# Patient Record
Sex: FEMALE | Race: WHITE | NOT HISPANIC OR LATINO | Employment: OTHER | URBAN - NONMETROPOLITAN AREA
[De-identification: names, ages, dates, MRNs, and addresses within clinical notes are randomized per-mention and may not be internally consistent; named-entity substitution may affect disease eponyms.]

---

## 2023-07-24 ENCOUNTER — HOSPITAL ENCOUNTER (OUTPATIENT)
Dept: PHYSICAL THERAPY | Facility: HOSPITAL | Age: 78
Setting detail: THERAPIES SERIES
Discharge: HOME | End: 2023-07-24
Payer: MEDICARE

## 2023-07-24 DIAGNOSIS — M79.671 BILATERAL FOOT PAIN: Primary | ICD-10-CM

## 2023-07-24 DIAGNOSIS — M79.672 BILATERAL FOOT PAIN: Primary | ICD-10-CM

## 2023-07-24 DIAGNOSIS — M19.079 ARTHRITIS OF MIDFOOT: ICD-10-CM

## 2023-07-24 PROCEDURE — 97535 SELF CARE MNGMENT TRAINING: CPT

## 2023-07-24 PROCEDURE — 97162 PT EVAL MOD COMPLEX 30 MIN: CPT

## 2023-07-24 PROCEDURE — 97110 THERAPEUTIC EXERCISES: CPT

## 2023-07-24 NOTE — THERAPY EVALUATION
Outpatient Physical Therapy Ortho Initial Evaluation  [facility]     Patient Name: Mona Lyle  : 1945  MRN: [id number]  Today's Date: 2023      Patient seen for 1 PT sessions.  Patient reports N/A% of improvement.  Next MD appt: 2023   Recertification: 2023      Therapy Diagnosis: B foot pain with increased hallux valgus     Visit Date: 2023    There is no problem list on file for this patient.       Past Medical History:   Diagnosis Date    Arthritis     Osteoporosis     Sleep apnea         Past Surgical History:   Procedure Laterality Date    EYE SURGERY      SHOULDER SURGERY Right     rotator cuff       Visit Dx:     ICD-10-CM ICD-9-CM   1. Bilateral foot pain  M79.671 729.5    M79.672    2. Arthritis of midfoot  M19.079 716.97          Patient History       Row Name 23 0900             History    Chief Complaint Pain  -AC      Type of Pain Foot pain  -AC      Date Current Problem(s) Began --  the last 6 months  -AC      Brief Description of Current Complaint Patient reports her foot pain in the past 6 months. Had x-ray. Saw some arthritis. Patient rpeorts she wears sandals most of the time. Wears clarks in the winter. Reports tennis shoes at home but they dont feel like they fit. Reports she has a narrow foot. Denies feet surgery. Knees have been bothering from gardening. Bottom of feet bother her the most. Denies orthotics.  -AC      Patient/Caregiver Goals Relieve pain;Know what to do to help the symptoms  -AC      Current Tobacco Use none  -AC      Smoking Status never  -AC      Patient's Rating of General Health Good  -AC      Occupation/sports/leisure activities Hobbies: gardening  -AC      Patient seeing anyone else for problem(s)? Yes, podiatry  -AC      What clinical tests have you had for this problem? X-ray  -AC      Results of Clinical Tests see EMR  -AC      Are you or can you be pregnant No  -AC         Pain     Pain Location Foot  -AC      Pain  "at Present --  2-3/10  -AC      Pain Frequency Intermittent  -AC      What Performance Factors Make the Current Problem(s) WORSE? standing prolonged periods, walking  -AC      What Performance Factors Make the Current Problem(s) BETTER? pain meds  -AC      Tolerance Time- Standing more than 1 hour  -AC      Tolerance Time- Sitting does not bother her  -AC      Tolerance Time- Walking more than 1 hour  -AC         Fall Risk Assessment    Any falls in the past year: Yes  -AC      Number of falls reported in the last 12 months \"several\"  -AC      Factors that contributed to the fall: Lost balance;Uneven surface  -AC         Daily Activities    Primary Language English  -AC      Are you able to read Yes  -AC      Are you able to write Yes  -AC                User Key  (r) = Recorded By, (t) = Taken By, (c) = Cosigned By      Initials Name Provider Type     Ursula Gomez, PT Physical Therapist                     PT Ortho       Row Name 07/24/23 0900       Subjective Comments    Subjective Comments see patient history  -AC       Precautions and Contraindications    Precautions/Limitations no known precautions/limitations  -AC       Subjective Pain    Pre-Treatment Pain Level 3  2-3/10  -AC       Posture/Observations    Thoracic Kyphosis Bilateral:;Moderate;Increased;Standing posture;Sitting posture  bending over  -AC    Rounded Shoulders Bilateral:;Moderate;Increased;Standing posture;Sitting posture  -AC    Scapular Elevation Left:;Moderate;Increased;Standing posture  -AC    Scapular winging Bilateral:;Mild;Increased;Standing posture  -AC    Lumbar lordosis Bilateral:;Mild;Decreased;Standing posture  flattened  -AC    Iliac crests Bilateral:;Normal  pelvis appears level, no LLD  -AC    Pes Planus Bilateral:;Mild;Increased;Standing posture  walking  -AC    Posture/Observations Comments No acute distress, increased hallux valgus present on L foot. Slight hallux valgus also noted on R side. Patient ambulates into " clinic with narrow, strappy sandals on with slight wedge.  -AC       Sensory Screen for Light Touch- Lower Quarter Clearing    L1 (inguinal area) Bilateral:;Intact  -AC    L2 (anterior mid thigh) Bilateral:;Intact  -AC    L3 (distal anterior thigh) Bilateral:;Intact  -AC    L4 (medial lower leg/foot) Bilateral:;Intact  -AC    L5 (lateral lower leg/great toe) Bilateral:;Intact  -AC    S1 (bottom of foot) Bilateral:;Intact  -AC       Lumbar ROM Screen- Lower Quarter Clearing    Lumbar Flexion Normal  -AC       Foot/Ankle Palpation    Met Heads Bilateral:;Tender  1st met head  -AC    Medial Gastroc Bilateral:;Tender  -AC    Lateral Gastroc Bilateral:;Tender  -AC    Metatarsals Bilateral:;Tender;Swollen  1st MTP  -AC       Ankle Accessory Motions    Distal tib/fib A-P glide Center:;WNL  -AC    Talocrural (talotibial) distraction Center:;WNL  -AC    Talocrural (talotibial) A-P glide Center:;WNL  -AC       Toes Accessory Motion    MTP Hallux - flexion Center:;Hypomobile;Right:;Left:  -AC    MTP Hallux - extension Center:;Hypomobile;Right:;Left:  -AC    MTP Hallux - abduction Center:;Hypomobile;Right:;Left:  -AC       Ankle/Foot Special Tests    Anterior drawer (ATFL lesion) Bilateral:;Negative  -AC    Tib/Fib Compression Bilateral:;Negative  -AC    Inversion Stress Test Bilateral:;Negative  -AC    Eversion Stress Test Bilateral:;Negative  -AC    Other Bilateral:;Negative  -AC       General ROM    GENERAL ROM COMMENTS B LE AROM WFL.  -AC       Right Lower Ext    Rt Ankle Dorsiflexion AROM 3°  -AC    Rt Ankle Plantarflexion AROM 43°  -AC    Rt Ankle Inversion AROM 30°  -AC    Rt Ankle Eversion AROM 10°  -AC       Left Lower Ext    Lt Ankle Dorsiflexion AROM 5°  -AC    Lt Ankle Plantarflexion AROM 40°  -AC    Lt Ankle Inversion AROM 33°  -AC    Lt Ankle Eversion AROM 11°  -AC       MMT Right Lower Ext    Rt Hip Flexion MMT, Gross Movement (4-/5) good minus  -AC    Rt Hip Extension MMT, Gross Movement (4/5) good  -AC    Rt  Hip ABduction MMT, Gross Movement (4/5) good  -AC    Rt Hip ADduction MMT, Gross Movement (3+/5) fair plus  -AC    Rt Hip Internal (Medial) Rotation MMT, Gross Movement (4/5) good  -AC    Rt Hip External (Lateral) Rotation MMT, Gross Movement (4/5) good  -AC    Rt Knee Extension MMT, Gross Movement (4/5) good  -AC    Rt Knee Flexion MMT, Gross Movement (4/5) good  -AC    Rt Ankle Plantarflexion MMT, Gross Movement (4/5) good  -AC    Rt Ankle Dorsiflexion MMT, Gross Movement (4/5) good  -AC    Rt Ankle Subtalar Inversion MT, Gross Movement (4-/5) good minus  -AC    Rt Ankle Subtalar Eversion MMT, Gross Movement (4-/5) good minus  -AC    Rt MTP Flexion MMT, Gross Movement (3/5) fair  -AC    Rt MTP Extension MMT, Gross Movement (3/5) fair  -AC       MMT Left Lower Ext    Lt Hip Flexion MMT, Gross Movement (4/5) good  -AC    Lt Hip Extension MMT, Gross Movement (4/5) good  -AC    Lt Hip ABduction MMT, Gross Movement (4-/5) good minus  -AC    Lt Hip ADduction MMT, Gross Movement (3+/5) fair plus  -AC    Lt Hip Internal (Medial) Rotation MMT, Gross Movement (4/5) good  -AC    Lt Hip External (Lateral) Rotation MMT, Gross Movement (4/5) good  -AC    Lt Knee Extension MMT, Gross Movement (4/5) good  -AC    Lt Knee Flexion MMT, Gross Movement (4/5) good  -AC    Lt Ankle Plantarflexion MMT, Gross Movement (4/5) good  -AC    Lt Ankle Dorsiflexion MMT, Gross Movement (4/5) good  -AC    Lt Ankle Subtalar Inversion MMT, Gross Movement (4-/5) good minus  -AC    Lt Ankle Subtalar Eversion MMT, Gross Movement (4-/5) good minus  -AC    Lt MTP Flexion MMT, Gross Movement (3/5) fair  -AC    Lt MTP Extension MMT, Gross Movement (3/5) fair  -AC       Sensation    Sensation WNL? WNL  -AC    Light Touch No apparent deficits  -AC       Lower Extremity Flexibility    Gastrocnemius Bilateral:;Mildly limited  -AC    Soleus Bilateral:;Mildly limited  -AC       Transfers    Comment, (Transfers) I with all transfers.  -AC       Gait/Stairs  (Locomotion)    [place] Level (Gait) independent  -    Distance in Feet (Gait) 75  -AC    Bilateral Gait Deviations forward flexed posture  -    Comment, (Gait/Stairs) I with ambulation into clinic with no AD. Increased pronation of B feet with ambulation with no shoes donned.  -              User Key  (r) = Recorded By, (t) = Taken By, (c) = Cosigned By      Initials Name Provider Type    AC Ursula Gomez, PT Physical Therapist                                Therapy Education  Education Details: HEP: exercises provided this visit; proper shoe wear, custom orthotics for B foot pain  Given: HEP, Symptoms/condition management, Pain management, Posture/body mechanics, Mobility training  Program: New  How Provided: Verbal, Written, Demonstration  Provided to: Patient  Level of Understanding: Teach back education performed, Verbalized, Demonstrated  29517 - PT Self Care/Mgmt Minutes: 10      PT OP Goals       Row Name 07/24/23 1000          PT Short Term Goals    STG Date to Achieve 08/14/23  -     STG 1 Patient to be I with HEP with additions/changes prior to next recertification.  -     STG 2 Patient to be compliant wear of wide toe box, closed toe tennis shoes >/= 75% of the time, to help prevent worsening of hallux valgus.  -     STG 3 B 1st MTP strength >/= 4/5.  -     STG 4 B subtalar inv/ev strength >/= 4+/5.  -     STG 5 Patient to verbalize understanding of benefits of B custom orthotics to help improve B foot pain.  -     STG 6 Patient to perform 4-way hip SLR x 20 reps in each direction to increase B hip strength.  -        Long Term Goals    LTG 1 I with final HEP.  -     LTG 2 B LE strength 5/5, no increase in pain.  -     LTG 3 Patient to perform >/= 10-15 minutes of standing B LE strength activities with no rerports of increased pain.  -     LTG 4 Patient to ambulate >/= 1/8 mile with B tennis shoes with non-antalgic gait with no increase in foot pain.  -     LTG 5 B SLS  >/= 30 seconds with EO/EC.  -AC        Time Calculation    PT Goal Re-Cert Due Date 08/14/23  -               User Key  (r) = Recorded By, (t) = Taken By, (c) = Cosigned By      Initials Name Provider Type    AC Ursula Gomez, PT Physical Therapist                     PT Assessment/Plan       Row Name 07/24/23 1000          PT Assessment    Functional Limitations Limitation in home management;Limitations in community activities;Performance in leisure activities;Impaired gait  -AC     Impairments Balance;Gait;Endurance;Muscle strength;Pain;Impaired muscle endurance;Impaired muscle length;Impaired flexibility;Joint mobility;Joint integrity;Range of motion;Posture;Poor body mechanics  -AC     Assessment Comments Patient is a 78 y.o. female who presents to PT with c/o B foot pain which started to worsen over 6 months ago. Patient presents with increased L hallux valgus which is most likely causing increased s/s. Patient presents with decreased 1st MTP AROM, B LE strength, balance, gait, flexibility, B arch support, and poor shoe wear. Pt would benefit from skilled PT to address deficits listed above. An HEP was established this visit. Pt reports she was receiving PT at another PT clinic for her back a while ago. Reports her insurance ran out and had to stop. Patient however unable to confirm she was officially discharged from clinic. Will need to investigate next visit.  -AC     Please refer to paper survey for additional self-reported information No  -AC     Rehab Potential Fair  -AC     Patient/caregiver participated in establishment of treatment plan and goals Yes  -AC     Patient would benefit from skilled therapy intervention Yes  -AC        PT Plan    PT Frequency 2x/week  -AC     Predicted Duration of Therapy Intervention (PT) 6-8 visits  -AC     Planned CPT's? PT EVAL MOD COMPLELITY: 83434;PT RE-EVAL: 72448;PT THER PROC EA 15 MIN: 18075;PT THER ACT EA 15 MIN: 04935;PT MANUAL THERAPY EA 15 MIN: 08759;PT  NEUROMUSC RE-EDUCATION EA 15 MIN: 19376;PT GAIT TRAINING EA 15 MIN: 55325;PT SELF CARE/HOME MGMT/TRAIN EA 15: 92941;PT HOT OR COLD PACK TREAT [facility];PT THER SUPP EA 15 MIN;PT SELF CARE/MGMT/TRAIN 15 MIN: 97268  -     PT Plan Comments Progress overall arch support, foot intrinsic strength, B LE strength, proper shoe wear and orthotics, flexibility, balance, and gait.  -AC               User Key  (r) = Recorded By, (t) = Taken By, (c) = Cosigned By      Initials Name Provider Type    Ursula Mims PT Physical Therapist                       OP Exercises       Row Name 07/24/23 0900             Subjective Comments    Subjective Comments see patient history  -AC         Subjective Pain    Able to rate subjective pain? yes  -AC      Pre-Treatment Pain Level 3  2-3/10  -AC      Post-Treatment Pain Level 0  tightness in calf  -AC         Exercise 1    Exercise Name 1 Pro II LE bike for AROM and strength  -AC      Time 1 3 minutes  -AC      Additional Comments Lv 3.5  -AC         Exercise 2    Exercise Name 2 Foot arch strengthening activitity  -AC      Sets 2 1  -AC      Reps 2 20  -AC         Exercise 3    Exercise Name 3 B gastroc S with strap  -AC      Reps 3 2  -AC      Time 3 30 sec hold  -AC         Exercise 4    Exercise Name 4 Toe spreading  -AC      Sets 4 1  -AC      Reps 4 20  -AC         Exercise 5    Exercise Name 5 Patient education  -AC                User Key  (r) = Recorded By, (t) = Taken By, (c) = Cosigned By      Initials Name Provider Type    Ursula Mims PT Physical Therapist                                  Outcome Measure Options: Lower Extremity Functional Scale (LEFS)  Lower Extremity Functional Index  Any of your usual work, housework or school activities: Moderate difficulty  Your usual hobbies, recreational or sporting activities: A little bit of difficulty  Getting into or out of the bath: No difficulty  Walking between rooms: No difficulty  Putting on your shoes or socks: No  difficulty  Squatting: No difficulty  Lifting an object, like a bag of groceries from the floor: No difficulty  Performing light activities around your home: No difficulty  Performing heavy activities around your home: No difficulty  Getting into or out of a car: No difficulty  Walking 2 blocks: No difficulty  Walking a mile: No difficulty  Going up or down 10 stairs (about 1 flight of stairs): No difficulty  Standing for 1 hour: No difficulty  Sitting for 1 hour: Moderate difficulty  Running on even ground: Extreme difficulty or unable to perform activity  Running on uneven ground: Extreme difficulty or unable to perform activity  Making sharp turns while running fast: Extreme difficulty or unable to perform activity  Hopping: Extreme difficulty or unable to perform activity  Rolling over in bed: No difficulty  Total: 59      Time Calculation:     Start Time: 1004  Stop Time: 1100  Time Calculation (min): 56 min  Total Timed Code Minutes- PT: 56 minute(s)  Timed Charges  89644 - PT Self Care/Mgmt Minutes: 10  Total Minutes  Timed Charges Total Minutes: 10   Total Minutes: 10     Therapy Charges for Today       Code Description Service Date Service Provider Modifiers Qty    56165316436 HC PT THER SUPP EA 15 MIN 7/24/2023 GomezUrsula, PT GP 1    34435362426 HC PT EVAL MOD COMPLEXITY 2 7/24/2023 NewportUrsula, PT GP 1    60308188177 HC PT THER PROC EA 15 MIN 7/24/2023 GomezUrsula, PT GP 1    29158070003 HC PT SELF CARE/MGMT/TRAIN EA 15 MIN 7/24/2023 GomezUrsula, PT GP 1            PT G-Codes  Outcome Measure Options: Lower Extremity Functional Scale (LEFS)  Total: 59         Ursula Patricia, PT, DPT  7/24/2023

## 2023-07-26 ENCOUNTER — HOSPITAL ENCOUNTER (OUTPATIENT)
Dept: PHYSICAL THERAPY | Facility: HOSPITAL | Age: 78
Setting detail: THERAPIES SERIES
Discharge: HOME | End: 2023-07-26
Payer: MEDICARE

## 2023-07-26 DIAGNOSIS — M79.672 BILATERAL FOOT PAIN: Primary | ICD-10-CM

## 2023-07-26 DIAGNOSIS — M19.079 ARTHRITIS OF MIDFOOT: ICD-10-CM

## 2023-07-26 DIAGNOSIS — M79.671 BILATERAL FOOT PAIN: Primary | ICD-10-CM

## 2023-07-26 PROCEDURE — 97110 THERAPEUTIC EXERCISES: CPT | Performed by: PHYSICAL THERAPIST

## 2023-07-26 NOTE — THERAPY TREATMENT NOTE
Outpatient Physical Therapy Ortho Treatment Note  [facility]     Patient Name: Mona Lyle  : 1945  MRN: [id number]  Today's Date: 2023      Visit Date: 2023    Patient seen for 2 PT sessions.  Patient reports N/A% of improvement.  Next MD appt: 2023   Recertification: 2023      Therapy Diagnosis: B foot pain with increased hallux valgus     Visit Dx:    ICD-10-CM ICD-9-CM   1. Bilateral foot pain  M79.671 729.5    M79.672    2. Arthritis of midfoot  M19.079 716.97       There is no problem list on file for this patient.       Past Medical History:   Diagnosis Date    Arthritis     Osteoporosis     Sleep apnea         Past Surgical History:   Procedure Laterality Date    EYE SURGERY      SHOULDER SURGERY Right     rotator cuff        PT Ortho       Row Name 23 1000       Subjective Comments    Subjective Comments Patient reprots that she is doing alright, just same ole same ole.  -       Precautions and Contraindications    Precautions/Limitations no known precautions/limitations  -       Subjective Pain    Able to rate subjective pain? yes  -AJ    Pre-Treatment Pain Level 0  -AJ    Post-Treatment Pain Level 0  -       Posture/Observations    Posture/Observations Comments No distress, comes i nwearing B sandal shoe wear.  -              User Key  (r) = Recorded By, (t) = Taken By, (c) = Cosigned By      Initials Name Provider Type    Mari Landa, PT DPT Physical Therapist                                 PT Assessment/Plan       Row Name 23 1000          PT Assessment    Assessment Comments Struggles with circles of wobble board. Good overall performance of HEP exercises. Added towel scrunches to HEP.  -        PT Plan    PT Frequency 2x/week  -     PT Plan Comments Add ankle tband next session.  -               User Key  (r) = Recorded By, (t) = Taken By, (c) = Cosigned By      Initials Name Provider Type    Mari Landa  "Luiz, PT DPT Physical Therapist                       OP Exercises       Row Name 07/26/23 1000             Subjective Comments    Subjective Comments Patient reprots that she is doing alright, just same ole same ole.  -AJ         Subjective Pain    Able to rate subjective pain? yes  -AJ      Pre-Treatment Pain Level 0  -AJ      Post-Treatment Pain Level 0  -AJ         Exercise 1    Exercise Name 1 Pro II UE/LE bike for ROM/strength  -AJ      Time 1 10 min  -AJ      Additional Comments L 4.0  -AJ         Exercise 2    Exercise Name 2 B Seated gastroc S with strap  -AJ      Reps 2 2  -AJ      Time 2 30 seconds  -AJ         Exercise 3    Exercise Name 3 B seated soleus S with strap  -AJ      Reps 3 2  -AJ      Time 3 30 seconds  -AJ         Exercise 4    Exercise Name 4 Foot arch strengthening \"short foot\"  -AJ      Sets 4 1  -AJ      Reps 4 20  -AJ      Time 4 5\" hold  -AJ         Exercise 5    Exercise Name 5 B Wobble board: F/R, S/S, circles: cw/ccw  -AJ      Time 5 1 min each  -AJ      Additional Comments small board  -AJ         Exercise 6    Exercise Name 6 B toe spreads  -AJ      Sets 6 1  -AJ      Reps 6 20  -AJ      Time 6 5\" hold  -AJ         Exercise 7    Exercise Name 7 Towel scrunches  -AJ      Time 7 2 min each foot  -AJ                User Key  (r) = Recorded By, (t) = Taken By, (c) = Cosigned By      Initials Name Provider Type    Mari Landa, PT DPT Physical Therapist                All therapeutic interventions performed today were to address current functional limitations and/or deficits in addressing all physical therapy goals.                    PT OP Goals       Row Name 07/26/23 1000          PT Short Term Goals    STG Date to Achieve 08/14/23  -AJ     STG 1 Patient to be I with HEP with additions/changes prior to next recertification.  -AJ     STG 1 Progress Partially Met;Ongoing  -AJ     STG 2 Patient to be compliant wear of wide toe box, closed toe tennis shoes >/= 75% of the " time, to help prevent worsening of hallux valgus.  -     STG 2 Progress Ongoing  -     STG 3 B 1st MTP strength >/= 4/5.  -     STG 4 B subtalar inv/ev strength >/= 4+/5.  -     STG 5 Patient to verbalize understanding of benefits of B custom orthotics to help improve B foot pain.  -     STG 6 Patient to perform 4-way hip SLR x 20 reps in each direction to increase B hip strength.  -     STG 6 Progress Ongoing  -        Long Term Goals    LTG 1 I with final HEP.  -     LTG 2 B LE strength 5/5, no increase in pain.  -     LTG 3 Patient to perform >/= 10-15 minutes of standing B LE strength activities with no rerports of increased pain.  -     LTG 4 Patient to ambulate >/= 1/8 mile with B tennis shoes with non-antalgic gait with no increase in foot pain.  -     LTG 5 B SLS >/= 30 seconds with EO/EC.  -        Time Calculation    PT Goal Re-Cert Due Date 08/14/23  -               User Key  (r) = Recorded By, (t) = Taken By, (c) = Cosigned By      Initials Name Provider Type     Mari Lira PT DPT Physical Therapist                    Therapy Education  Education Details: HEP: add towel scrunches  Given: HEP, Other (comment) (shoe wear)  Program: New, Reinforced  How Provided: Verbal, Demonstration, Written  Provided to: Patient  Level of Understanding: Teach back education performed, Verbalized, Demonstrated              Time Calculation:   Start Time: 1046  Stop Time: 1131  Time Calculation (min): 45 min  Total Timed Code Minutes- PT: 45 minute(s)  Therapy Charges for Today       Code Description Service Date Service Provider Modifiers Qty    84674242639 HC PT THER SUPP EA 15 MIN 7/26/2023 Mari Lira, PT DPT GP 1    19799522526 HC PT THER PROC EA 15 MIN 7/26/2023 Mari Lira PT DPT GP 3                    This document has been electronically signed by Mari Lira PT GRACE, [facility] on July 26, 2023 11:45 CDT

## 2023-07-31 ENCOUNTER — HOSPITAL ENCOUNTER (OUTPATIENT)
Dept: PHYSICAL THERAPY | Facility: HOSPITAL | Age: 78
Setting detail: THERAPIES SERIES
Discharge: HOME | End: 2023-07-31
Payer: MEDICARE

## 2023-07-31 DIAGNOSIS — M19.079 ARTHRITIS OF MIDFOOT: ICD-10-CM

## 2023-07-31 DIAGNOSIS — M79.672 BILATERAL FOOT PAIN: Primary | ICD-10-CM

## 2023-07-31 DIAGNOSIS — M79.671 BILATERAL FOOT PAIN: Primary | ICD-10-CM

## 2023-07-31 PROCEDURE — 97110 THERAPEUTIC EXERCISES: CPT

## 2023-08-03 ENCOUNTER — HOSPITAL ENCOUNTER (OUTPATIENT)
Dept: PHYSICAL THERAPY | Facility: HOSPITAL | Age: 78
Setting detail: THERAPIES SERIES
Discharge: HOME | End: 2023-08-03
Payer: MEDICARE

## 2023-08-03 DIAGNOSIS — M79.672 BILATERAL FOOT PAIN: Primary | ICD-10-CM

## 2023-08-03 DIAGNOSIS — M79.671 BILATERAL FOOT PAIN: Primary | ICD-10-CM

## 2023-08-03 DIAGNOSIS — M19.079 ARTHRITIS OF MIDFOOT: ICD-10-CM

## 2023-08-03 PROCEDURE — 97110 THERAPEUTIC EXERCISES: CPT

## 2023-08-07 ENCOUNTER — HOSPITAL ENCOUNTER (OUTPATIENT)
Dept: PHYSICAL THERAPY | Facility: HOSPITAL | Age: 78
Setting detail: THERAPIES SERIES
Discharge: HOME | End: 2023-08-07
Payer: MEDICARE

## 2023-08-07 DIAGNOSIS — M79.672 BILATERAL FOOT PAIN: Primary | ICD-10-CM

## 2023-08-07 DIAGNOSIS — M19.079 ARTHRITIS OF MIDFOOT: ICD-10-CM

## 2023-08-07 DIAGNOSIS — M79.671 BILATERAL FOOT PAIN: Primary | ICD-10-CM

## 2023-08-07 PROCEDURE — 97110 THERAPEUTIC EXERCISES: CPT | Performed by: PHYSICAL THERAPIST

## 2023-08-07 NOTE — THERAPY TREATMENT NOTE
Outpatient Physical Therapy Ortho Treatment Note  [facility]     Patient Name: Mona Lyle  : 1945  MRN: [id number]  Today's Date: 2023      Visit Date: 2023    Pt seen for 5 PT sessions  Reported Improvement:  N/A %  MD Visit: 2023  Recheck Date: 2023     Therapy Diagnosis:  B foot pain with increased hallux valgus       Visit Dx:    ICD-10-CM ICD-9-CM   1. Bilateral foot pain  M79.671 729.5    M79.672    2. Arthritis of midfoot  M19.079 716.97       There is no problem list on file for this patient.       Past Medical History:   Diagnosis Date    Arthritis     Osteoporosis     Sleep apnea         Past Surgical History:   Procedure Laterality Date    EYE SURGERY      SHOULDER SURGERY Right     rotator cuff        PT Ortho       Row Name 23 1000       Subjective Comments    Subjective Comments Patient reports that she thinks she over did it last visit and had to take some ibuprofen.  -       Precautions and Contraindications    Precautions/Limitations no known precautions/limitations  -       Subjective Pain    Able to rate subjective pain? yes  -STEPHEN    Pre-Treatment Pain Level 0  -    Post-Treatment Pain Level 0  -              User Key  (r) = Recorded By, (t) = Taken By, (c) = Cosigned By      Initials Name Provider Type    Mari Landa PT DPT Physical Therapist                                 PT Assessment/Plan       Row Name 23 1000          PT Assessment    Assessment Comments Patient required minor cueing for tband ankle inv/erv. She erprots she thinks she was doing them wrong at home. With cueing patient able ot perform correctly. No issues with new exercises today.  -        PT Plan    PT Frequency 2x/week  -     PT Plan Comments Add B add SLR and SLS next session.  -STEPHEN               User Key  (r) = Recorded By, (t) = Taken By, (c) = Cosigned By      Initials Name Provider Type    Mari Landa, PT DPT Physical  "Therapist                       OP Exercises       Row Name 08/07/23 1000             Subjective Comments    Subjective Comments Patient reports that she thinks she over did it last visit and had to take some ibuprofen.  -AJ         Subjective Pain    Able to rate subjective pain? yes  -AJ      Pre-Treatment Pain Level 0  -AJ      Post-Treatment Pain Level 0  -AJ         Exercise 1    Exercise Name 1 Pro II UE/LE bike for ROM/strength  -AJ      Time 1 10 min  -AJ      Additional Comments L 5.5  -AJ         Exercise 2    Exercise Name 2 B St. inclince calf S  -AJ      Reps 2 2  -AJ      Time 2 30 seconds  -AJ         Exercise 3    Exercise Name 3 B Ankle tband inv/erv  -AJ      Sets 3 1  -AJ      Reps 3 10 each  -AJ      Additional Comments RTB  -AJ         Exercise 4    Exercise Name 4 marble : erv  -AJ      Reps 4 1 cup each  -AJ         Exercise 5    Exercise Name 5 B SLR- abduction  -AJ      Sets 5 1  -AJ      Reps 5 10 each  -AJ      Time 5 5\" hold  -AJ         Exercise 6    Exercise Name 6 B SLR  -AJ      Sets 6 1  -AJ      Reps 6 10  -AJ      Time 6 5\" hold  -AJ         Exercise 7    Exercise Name 7 Sit to/from stand  -AJ      Sets 7 1  -AJ      Reps 7 10  -AJ      Additional Comments B UE A  -AJ                User Key  (r) = Recorded By, (t) = Taken By, (c) = Cosigned By      Initials Name Provider Type    Mari Landa, PT DPT Physical Therapist                  All therapeutic interventions performed today were to address current functional limitations and/or deficits in addressing all physical therapy goals.                  PT OP Goals       Row Name 08/07/23 1000          PT Short Term Goals    STG Date to Achieve 08/14/23  -AJ     STG 1 Patient to be I with HEP with additions/changes prior to next recertification.  -AJ     STG 1 Progress Partially Met;Ongoing  -AJ     STG 2 Patient to be compliant wear of wide toe box, closed toe tennis shoes >/= 75% of the time, to help prevent " worsening of hallux valgus.  -     STG 2 Progress Ongoing  -     STG 3 B 1st MTP strength >/= 4/5.  -     STG 4 B subtalar inv/ev strength >/= 4+/5.  -     STG 5 Patient to verbalize understanding of benefits of B custom orthotics to help improve B foot pain.  -     STG 6 Patient to perform 4-way hip SLR x 20 reps in each direction to increase B hip strength.  -     STG 6 Progress Ongoing;Progressing  -        Long Term Goals    LTG 1 I with final HEP.  -     LTG 2 B LE strength 5/5, no increase in pain.  -     LTG 3 Patient to perform >/= 10-15 minutes of standing B LE strength activities with no rerports of increased pain.  -     LTG 4 Patient to ambulate >/= 1/8 mile with B tennis shoes with non-antalgic gait with no increase in foot pain.  -     LTG 5 B SLS >/= 30 seconds with EO/EC.  -        Time Calculation    PT Goal Re-Cert Due Date 08/14/23  -               User Key  (r) = Recorded By, (t) = Taken By, (c) = Cosigned By      Initials Name Provider Type    Mari Landa PT DPT Physical Therapist                                   Time Calculation:   Start Time: 1003  Stop Time: 1045  Time Calculation (min): 42 min  Total Timed Code Minutes- PT: 42 minute(s)  Therapy Charges for Today       Code Description Service Date Service Provider Modifiers Qty    92845850979 HC PT THER SUPP EA 15 MIN 8/7/2023 Mari Lira, PT DPT GP 1    35796501236 HC PT THER PROC EA 15 MIN 8/7/2023 Mari Lira PT DPT GP 3                    This document has been electronically signed by Mari Lira PT GRACE, [facility] on August 7, 2023 10:57 CDT

## 2023-08-09 ENCOUNTER — HOSPITAL ENCOUNTER (OUTPATIENT)
Dept: PHYSICAL THERAPY | Facility: HOSPITAL | Age: 78
Setting detail: THERAPIES SERIES
Discharge: HOME | End: 2023-08-09
Payer: MEDICARE

## 2023-08-09 DIAGNOSIS — M79.672 BILATERAL FOOT PAIN: Primary | ICD-10-CM

## 2023-08-09 DIAGNOSIS — M79.671 BILATERAL FOOT PAIN: Primary | ICD-10-CM

## 2023-08-09 DIAGNOSIS — M19.079 ARTHRITIS OF MIDFOOT: ICD-10-CM

## 2023-08-09 PROCEDURE — 97110 THERAPEUTIC EXERCISES: CPT

## 2023-08-09 NOTE — THERAPY TREATMENT NOTE
Outpatient Physical Therapy Ortho Treatment Note  [facility]     Patient Name: Mona Lyle  : 1945  MRN: [id number]  Today's Date: 2023    Pt seen for 6 PT sessions  Reported Improvement:  N/A %  MD Visit: 2023  Recheck Date: 2023    Therapy Diagnosis:  B foot pain with increased hallux valgus            Visit Date: 2023    Visit Dx:    ICD-10-CM ICD-9-CM   1. Bilateral foot pain  M79.671 729.5    M79.672    2. Arthritis of midfoot  M19.079 716.97       There is no problem list on file for this patient.       Past Medical History:   Diagnosis Date    Arthritis     Osteoporosis     Sleep apnea         Past Surgical History:   Procedure Laterality Date    EYE SURGERY      SHOULDER SURGERY Right     rotator cuff                        PT Assessment/Plan       Row Name 23 1000          PT Assessment    Assessment Comments Pt with good formand technqiue with LE stretches.  Good effort with SLR both directoipns.  Discussed foot wear, orthotic expectations and education on benefitis of both.  Pt states she has been hesitant to buy new shoes not knowing how the orthotics will fit.  Discussed using curretn pair of tennis shoes prior to purchasing new opair as well as consider purchasing 1/2 size larger to accomaodate as an option.  Pt is out of town for 1 week and was reminded to take tband for continued HEP  -        PT Plan    PT Frequency 2x/week  -     PT Plan Comments Next visit add shuttle with airex.  -               User Key  (r) = Recorded By, (t) = Taken By, (c) = Cosigned By      Initials Name Provider Type    Holli Mulligan PTA Physical Therapist Assistant                       OP Exercises       Row Name 23 0900             Subjective Comments    Subjective Comments Doing good today.  -ESTEFANIA         Subjective Pain    Able to rate subjective pain? yes  -ESTEFANIA      Pre-Treatment Pain Level 0  -ESTEFANIA      Post-Treatment Pain Level 0  -ESTEFANIA         Exercise 1     Exercise Name 1 Pro II UE/LE bike for ROM/strength  -      Time 1 10 min  -JW      Additional Comments L 6.0  -JW         Exercise 2    Exercise Name 2 B St. inclince calf S  -JW      Reps 2 2  -JW      Time 2 30 seconds  -JW         Exercise 3    Exercise Name 3 B st incline soleus st  -JW      Reps 3 2  -JW      Time 3 30  -JW         Exercise 4    Exercise Name 4 Sit to stands  -JW      Additional Comments No UE assist  -JW         Exercise 5    Exercise Name 5 Education on orthotics and closed toe shoes  -JW         Exercise 6    Exercise Name 6 SLS  -JW      Reps 6 3  -JW      Time 6 20 seconds ea LE  -JW         Exercise 7    Exercise Name 7 SL SLR  -JW      Sets 7 1  -JW      Reps 7 15  -JW         Exercise 8    Exercise Name 8 B SLR  -JW      Sets 8 1  -JW      Reps 8 15  -JW         Exercise 9    Exercise Name 9 Educaiton on sitting posture while driving and taking tband on trip for continued HEP  -JW                User Key  (r) = Recorded By, (t) = Taken By, (c) = Cosigned By      Initials Name Provider Type    Holli Mulligan PTA Physical Therapist Assistant                                  PT OP Goals       Row Name 08/09/23 0900          PT Short Term Goals    STG Date to Achieve 08/14/23  -     STG 1 Patient to be I with HEP with additions/changes prior to next recertification.  -     STG 1 Progress Partially Met;Ongoing  -     STG 2 Patient to be compliant wear of wide toe box, closed toe tennis shoes >/= 75% of the time, to help prevent worsening of hallux valgus.  -     STG 2 Progress Ongoing  -     STG 3 B 1st MTP strength >/= 4/5.  -     STG 4 B subtalar inv/ev strength >/= 4+/5.  -     STG 5 Patient to verbalize understanding of benefits of B custom orthotics to help improve B foot pain.  -     STG 5 Progress Progressing  -     STG 6 Patient to perform 4-way hip SLR x 20 reps in each direction to increase B hip strength.  -     STG 6 Progress Ongoing;Progressing  -         Long Term Goals    LTG 1 I with final HEP.  -     LTG 2 B LE strength 5/5, no increase in pain.  -     LTG 3 Patient to perform >/= 10-15 minutes of standing B LE strength activities with no rerports of increased pain.  -     LTG 4 Patient to ambulate >/= 1/8 mile with B tennis shoes with non-antalgic gait with no increase in foot pain.  -     LTG 5 B SLS >/= 30 seconds with EO/EC.  -        Time Calculation    PT Goal Re-Cert Due Date 08/14/23  -               User Key  (r) = Recorded By, (t) = Taken By, (c) = Cosigned By      Initials Name Provider Type    Holli Mulligan PTA Physical Therapist Assistant                    Therapy Education  Education Details: B SLR, SL SLR  Given: HEP, Symptoms/condition management  Program: New, Reinforced, Progressed  How Provided: Verbal, Demonstration  Provided to: Patient  Level of Understanding: Verbalized, Demonstrated              Time Calculation:   Start Time: 0918  Stop Time: 1004  Time Calculation (min): 46 min  Therapy Charges for Today       Code Description Service Date Service Provider Modifiers Qty    98940207530 HC PT THER PROC EA 15 MIN 8/9/2023 Holli Lopez PTA GP, CQ 3    11537279497 HC PT THER SUPP EA 15 MIN 8/9/2023 Holli Lopez PTA GP, CQ 1                      Holli Lopez PTA  8/9/2023

## 2023-08-21 ENCOUNTER — HOSPITAL ENCOUNTER (OUTPATIENT)
Dept: PHYSICAL THERAPY | Facility: HOSPITAL | Age: 78
Setting detail: THERAPIES SERIES
Discharge: HOME | End: 2023-08-21
Payer: MEDICARE

## 2023-08-21 DIAGNOSIS — M79.672 BILATERAL FOOT PAIN: Primary | ICD-10-CM

## 2023-08-21 DIAGNOSIS — M79.671 BILATERAL FOOT PAIN: Primary | ICD-10-CM

## 2023-08-21 DIAGNOSIS — M19.079 ARTHRITIS OF MIDFOOT: ICD-10-CM

## 2023-08-21 PROCEDURE — 97110 THERAPEUTIC EXERCISES: CPT | Performed by: PHYSICAL THERAPIST

## 2023-08-21 NOTE — THERAPY TREATMENT NOTE
Outpatient Physical Therapy Ortho Treatment Note  [facility]     Patient Name: Mona Lyle  : 1945  MRN: [id number]  Today's Date: 2023      Visit Date: 2023       Pt seen for 7 PT sessions  Reported Improvement:  N/A %  MD Visit: 2023  Recheck Date: 2023     Therapy Diagnosis:  B foot pain with increased hallux valgus        Visit Dx:    ICD-10-CM ICD-9-CM   1. Bilateral foot pain  M79.671 729.5    M79.672    2. Arthritis of midfoot  M19.079 716.97       There is no problem list on file for this patient.       Past Medical History:   Diagnosis Date    Arthritis     Osteoporosis     Sleep apnea         Past Surgical History:   Procedure Laterality Date    EYE SURGERY      SHOULDER SURGERY Right     rotator cuff        PT Ortho       Row Name 23 1000       Precautions and Contraindications    Precautions/Limitations no known precautions/limitations  -       Subjective Pain    Post-Treatment Pain Level 0  -       Posture/Observations    Posture/Observations Comments Wearing B sandals with toes hanging off the front due ot back strap being so tight.  -              User Key  (r) = Recorded By, (t) = Taken By, (c) = Cosigned By      Initials Name Provider Type    Mari Landa, PT DPT Physical Therapist                                 PT Assessment/Plan       Row Name 23 1000          PT Assessment    Assessment Comments Patient continues to wear open toed shoes to treatment sessions. Still requires n/off finger tip A for SLS.  -        PT Plan    PT Frequency 2x/week  -     PT Plan Comments recheck with primary PT next session. Anticipate D/C soon.  -               User Key  (r) = Recorded By, (t) = Taken By, (c) = Cosigned By      Initials Name Provider Type    Mari Landa, PT DPT Physical Therapist                       OP Exercises       Row Name 23 1000             Subjective Comments    Subjective Comments  "Patientreports she continues to do well and no pain.  -AJ         Subjective Pain    Able to rate subjective pain? yes  -AJ      Pre-Treatment Pain Level 0  -AJ      Post-Treatment Pain Level 0  -AJ         Exercise 1    Exercise Name 1 Pro II UE/LE bike for ROM/strength  -AJ      Time 1 10 min  -AJ      Additional Comments L 6.0  -AJ         Exercise 2    Exercise Name 2 B St. inclince calf S  -AJ      Reps 2 2  -AJ      Time 2 30 seconds  -AJ         Exercise 3    Exercise Name 3 B st incline soleus st  -AJ      Reps 3 2  -AJ      Time 3 30 sconds  -AJ         Exercise 4    Exercise Name 4 B short foot  -AJ      Sets 4 1  -AJ      Reps 4 20  -AJ      Time 4 5\" hold  -AJ      Additional Comments in standing  -AJ         Exercise 5    Exercise Name 5 Shuttle: 2L [employer] Press  -AJ      Time 5 5 min  -AJ      Additional Comments 4 cords  -AJ         Exercise 6    Exercise Name 6 B Shuttle: 1L DD Press  -AJ      Time 6 3 min each  -AJ      Additional Comments 3 cords  -AJ         Exercise 7    Exercise Name 7 B SLS EO  -AJ      Reps 7 3  -AJ      Time 7 30 seconds  -AJ      Additional Comments on/off fingertip A  -AJ         Exercise 8    Exercise Name 8 B SLS EC  -AJ      Reps 8 3  -AJ      Time 8 30 seconds  -AJ      Additional Comments on/off fingertip A  -AJ                User Key  (r) = Recorded By, (t) = Taken By, (c) = Cosigned By      Initials Name Provider Type    Mari Landa, PT DPT Physical Therapist                All therapeutic interventions performed today were to address current functional limitations and/or deficits in addressing all physical therapy goals.                    PT OP Goals       Row Name 08/21/23 1000          PT Short Term Goals    STG Date to Achieve 08/14/23  -AJ     STG 1 Patient to be I with HEP with additions/changes prior to next recertification.  -AJ     STG 1 Progress Partially Met;Ongoing  -AJ     STG 2 Patient to be compliant wear of wide toe box, closed toe " tennis shoes >/= 75% of the time, to help prevent worsening of hallux valgus.  -     STG 2 Progress Ongoing  -     STG 3 B 1st MTP strength >/= 4/5.  -     STG 4 B subtalar inv/ev strength >/= 4+/5.  -     STG 5 Patient to verbalize understanding of benefits of B custom orthotics to help improve B foot pain.  -     STG 5 Progress Progressing  -     STG 6 Patient to perform 4-way hip SLR x 20 reps in each direction to increase B hip strength.  -     STG 6 Progress Ongoing;Progressing  -        Long Term Goals    LTG 1 I with final HEP.  -     LTG 2 B LE strength 5/5, no increase in pain.  -     LTG 3 Patient to perform >/= 10-15 minutes of standing B LE strength activities with no rerports of increased pain.  -     LTG 4 Patient to ambulate >/= 1/8 mile with B tennis shoes with non-antalgic gait with no increase in foot pain.  -     LTG 5 B SLS >/= 30 seconds with EO/EC.  -        Time Calculation    PT Goal Re-Cert Due Date 08/14/23  -               User Key  (r) = Recorded By, (t) = Taken By, (c) = Cosigned By      Initials Name Provider Type    Mari Landa PT DPT Physical Therapist                                   Time Calculation:   Start Time: 1009 (patient arrived late)  Stop Time: 1047  Time Calculation (min): 38 min  Total Timed Code Minutes- PT: 38 minute(s)  Therapy Charges for Today       Code Description Service Date Service Provider Modifiers Qty    05270580467 HC PT THER SUPP EA 15 MIN 8/21/2023 Mari Lira PT DPT GP 1    14013198970 HC PT THER PROC EA 15 MIN 8/21/2023 Mari Lira PT DPT GP 3                    This document has been electronically signed by Mari Lira PT DPT, [facility] on August 21, 2023 11:26 CDT

## 2023-08-24 ENCOUNTER — APPOINTMENT (OUTPATIENT)
Dept: PHYSICAL THERAPY | Facility: HOSPITAL | Age: 78
End: 2023-08-24
Payer: MEDICARE

## 2023-08-28 ENCOUNTER — HOSPITAL ENCOUNTER (OUTPATIENT)
Dept: PHYSICAL THERAPY | Facility: HOSPITAL | Age: 78
Setting detail: THERAPIES SERIES
Discharge: HOME | End: 2023-08-28
Payer: MEDICARE

## 2023-08-28 DIAGNOSIS — M79.671 BILATERAL FOOT PAIN: Primary | ICD-10-CM

## 2023-08-28 DIAGNOSIS — M19.079 ARTHRITIS OF MIDFOOT: ICD-10-CM

## 2023-08-28 DIAGNOSIS — M79.672 BILATERAL FOOT PAIN: Primary | ICD-10-CM

## 2023-08-28 PROCEDURE — 97530 THERAPEUTIC ACTIVITIES: CPT | Performed by: PHYSICAL THERAPIST

## 2023-08-28 PROCEDURE — 97110 THERAPEUTIC EXERCISES: CPT | Performed by: PHYSICAL THERAPIST

## 2023-08-28 NOTE — THERAPY DISCHARGE NOTE
Outpatient Physical Therapy Ortho Progress Note/Discharge Summary  [facility]     Patient Name: Mona Lyle  : 1945  MRN: [id number]  Today's Date: 2023      Visit Date: 2023    Patient seen for 8 PT sessions.  Patient reports 80-90% of improvement.  Next MD appt: 2023.  Recertification: N/A    Therapy Diagnosis: B foot pain with increased hallux valgus           Visit Dx:    ICD-10-CM ICD-9-CM   1. Bilateral foot pain  M79.671 729.5    M79.672    2. Arthritis of midfoot  M19.079 716.97       There is no problem list on file for this patient.       Past Medical History:   Diagnosis Date    Arthritis     Osteoporosis     Sleep apnea         Past Surgical History:   Procedure Laterality Date    EYE SURGERY      SHOULDER SURGERY Right     rotator cuff        PT Ortho       Row Name 23 1000       Subjective Comments    Subjective Comments Patient reports she has had a really busy morning. She repors that she feels like she is running out of steam.  -AJ       Precautions and Contraindications    Precautions/Limitations no known precautions/limitations  -AJ       Subjective Pain    Able to rate subjective pain? yes  -AJ    Pre-Treatment Pain Level 0  -AJ    Post-Treatment Pain Level 0  -AJ       Posture/Observations    Thoracic Kyphosis Bilateral:;Moderate;Increased;Standing posture;Sitting posture  -AJ    Rounded Shoulders Bilateral:;Moderate;Increased;Standing posture;Sitting posture  -AJ    Scapular Elevation Left:;Moderate;Increased;Standing posture  -AJ    Scapular winging Bilateral:;Mild;Increased;Standing posture  -AJ    Lumbar lordosis Bilateral:;Mild;Decreased;Standing posture  -AJ    Iliac crests Bilateral:;Normal  -AJ    Pes Planus Bilateral:;Mild;Increased;Standing posture  -AJ    Posture/Observations Comments Wearing B sandals with toes hanging off the front due to back strap being so tight.  -AJ       Sensory Screen for Light Touch- Lower Quarter Clearing    L1  (inguinal area) Bilateral:;Intact  -AJ    L2 (anterior mid thigh) Bilateral:;Intact  -AJ    L3 (distal anterior thigh) Bilateral:;Intact  -AJ    L4 (medial lower leg/foot) Bilateral:;Intact  -AJ    L5 (lateral lower leg/great toe) Bilateral:;Intact  -AJ    S1 (bottom of foot) Bilateral:;Intact  -AJ       General ROM    GENERAL ROM COMMENTS B LE AROM WFL.  -AJ       Right Lower Ext    Rt Ankle Dorsiflexion AROM 12ø  -AJ    Rt Ankle Plantarflexion AROM 60ø  -AJ    Rt Ankle Inversion AROM 52ø  -AJ    Rt Ankle Eversion AROM 24ø  -AJ       Left Lower Ext    Lt Ankle Dorsiflexion AROM 10ø  -AJ    Lt Ankle Plantarflexion AROM 60ø  -AJ    Lt Ankle Inversion AROM 40ø  -AJ    Lt Ankle Eversion AROM 22ø  -AJ       MMT Right Lower Ext    Rt Hip Flexion MMT, Gross Movement (5/5) normal  -AJ    Rt Hip Extension MMT, Gross Movement (5/5) normal  -AJ    Rt Hip ABduction MMT, Gross Movement (5/5) normal  -AJ    Rt Hip ADduction MMT, Gross Movement (5/5) normal  -AJ    Rt Hip Internal (Medial) Rotation MMT, Gross Movement (5/5) normal  -AJ    Rt Hip External (Lateral) Rotation MMT, Gross Movement (5/5) normal  -AJ    Rt Knee Extension MMT, Gross Movement (5/5) normal  -AJ    Rt Knee Flexion MMT, Gross Movement (5/5) normal  -AJ    Rt Ankle Plantarflexion MMT, Gross Movement (5/5) normal  -AJ    Rt Ankle Dorsiflexion MMT, Gross Movement (5/5) normal  -AJ    Rt Ankle Subtalar Inversion MT, Gross Movement (5/5) normal  -AJ    Rt Ankle Subtalar Eversion MMT, Gross Movement (5/5) normal  -AJ    Rt MTP Flexion MMT, Gross Movement (5/5) normal  -AJ    Rt MTP Extension MMT, Gross Movement (5/5) normal  -AJ       MMT Left Lower Ext    Lt Hip Flexion MMT, Gross Movement (5/5) normal  -AJ    Lt Hip Extension MMT, Gross Movement (5/5) normal  -AJ    Lt Hip ABduction MMT, Gross Movement (5/5) normal  -AJ    Lt Hip ADduction MMT, Gross Movement (5/5) normal  -AJ    Lt Hip Internal (Medial) Rotation MMT, Gross Movement (5/5) normal  -AJ    Lt Hip  External (Lateral) Rotation MMT, Gross Movement (5/5) normal  -AJ    Lt Knee Extension MMT, Gross Movement (5/5) normal  -AJ    Lt Knee Flexion MMT, Gross Movement (5/5) normal  -AJ    Lt Ankle Plantarflexion MMT, Gross Movement (5/5) normal  -AJ    Lt Ankle Dorsiflexion MMT, Gross Movement (5/5) normal  -AJ    Lt Ankle Subtalar Inversion MMT, Gross Movement (5/5) normal  -AJ    Lt Ankle Subtalar Eversion MMT, Gross Movement (5/5) normal  -AJ    Lt MTP Flexion MMT, Gross Movement (5/5) normal  -AJ    Lt MTP Extension MMT, Gross Movement (5/5) normal  -AJ       Sensation    Sensation WNL? WNL  -AJ    Light Touch No apparent deficits  -AJ       Lower Extremity Flexibility    Gastrocnemius Bilateral:;Mildly limited  -AJ    Soleus Bilateral:;Mildly limited  -AJ       Transfers    Comment, (Transfers) I with all transfers.  -AJ       Gait/Stairs (Locomotion)    [place] Level (Gait) independent  -AJ    Distance in Feet (Gait) >1/8th mile  -AJ    Comment, (Gait/Stairs) I with ambulation into clinic with no AD. Increased pronation of B feet with ambulation with no shoes donned.  -AJ              User Key  (r) = Recorded By, (t) = Taken By, (c) = Cosigned By      Initials Name Provider Type    AJ Mari Lira, PT DPT Physical Therapist                                 PT Assessment/Plan       Row Name 08/28/23 1000          PT Assessment    Functional Limitations Limitations in community activities;Performance in leisure activities  -AJ     Impairments Balance;Impaired muscle endurance;Joint integrity  -     Assessment Comments Patient met most goals. She no whas normal ROM and strength. Patient was again educated in shoe wear and custom orthotics. Issued GTB for HEP progression.  -AJ     Please refer to paper survey for additional self-reported information No  -AJ     Rehab Potential Fair  -AJ     Patient/caregiver participated in establishment of treatment plan and goals Yes  -AJ     Patient would benefit  "from skilled therapy intervention No  -AJ        PT Plan    PT Frequency --  N/A  -AJ     Predicted Duration of Therapy Intervention (PT) --  -AJ     PT Plan Comments D/C today with final HEP.  -AJ               User Key  (r) = Recorded By, (t) = Taken By, (c) = Cosigned By      Initials Name Provider Type    Mari Landa, PT DPT Physical Therapist                         OP Exercises       Row Name 08/28/23 1000             Subjective Comments    Subjective Comments Patient reports she has had a really busy morning. She repors that she feels like she is running out of steam.  -AJ         Subjective Pain    Able to rate subjective pain? yes  -AJ      Pre-Treatment Pain Level 0  -AJ      Post-Treatment Pain Level 0  -AJ         Exercise 1    Exercise Name 1 Pro II UE/LE bike for ROM/strength  -AJ      Time 1 8 min  -AJ      Additional Comments L 6.0  -AJ         Exercise 2    Exercise Name 2 B St. inclince calf S  -AJ      Reps 2 2  -AJ      Time 2 30 seconds  -AJ         Exercise 3    Exercise Name 3 B st incline soleus st  -AJ      Reps 3 2  -AJ      Time 3 30 sconds  -AJ         Exercise 4    Exercise Name 4 measurements  -AJ         Exercise 5    Exercise Name 5 B SLR 4-way  -AJ      Sets 5 1  -AJ      Reps 5 20 each  -AJ      Time 5 5\" hold  -AJ         Exercise 6    Exercise Name 6 Gym: Track Walk  -AJ      Reps 6 2 laps  -AJ      Additional Comments 1/8th mile  -AJ         Exercise 7    Exercise Name 7 B SLS EO  -AJ      Reps 7 3  -AJ      Time 7 30 seconds  -AJ         Exercise 8    Exercise Name 8 B SLS EC  -AJ      Reps 8 1  -AJ      Time 8 30 seconds  -AJ      Additional Comments on/off fingertip A  -AJ         Exercise 9    Exercise Name 9 B short foot  -AJ      Sets 9 1  -AJ      Reps 9 20  -AJ      Time 9 5\" hold  -AJ         Exercise 10    Exercise Name 10 B toe spreads  -AJ      Sets 10 1  -AJ      Reps 10 20  -AJ      Time 10 5\" hold  -AJ         Exercise 11    Exercise Name 11 B ankle " tband 4-way  -      Sets 11 1  -      Reps 11 20  -      Additional Comments GTB  -         Exercise 12    Exercise Name 12 Towel scrunches  -      Reps 12 --  -      Time 12 2 min each foot  -      Additional Comments --  -                User Key  (r) = Recorded By, (t) = Taken By, (c) = Cosigned By      Initials Name Provider Type    Mari Landa, PT DPT Physical Therapist                  All therapeutic interventions performed today were to address current functional limitations and/or deficits in addressing all physical therapy goals.                    PT OP Goals       Row Name 08/28/23 1000          PT Short Term Goals    STG Date to Achieve 08/14/23  -     STG 1 Patient to be I with HEP with additions/changes prior to next recertification.  -     STG 1 Progress Met  -     STG 2 Patient to be compliant wear of wide toe box, closed toe tennis shoes >/= 75% of the time, to help prevent worsening of hallux valgus.  -     STG 2 Progress Not Met  -     STG 2 Progress Comments Patient always wears sandals to PT  -     STG 3 B 1st MTP strength >/= 4/5.  -     STG 3 Progress Met  -     STG 4 B subtalar inv/ev strength >/= 4+/5.  -     STG 4 Progress Met  -     STG 5 Patient to verbalize understanding of benefits of B custom orthotics to help improve B foot pain.  -     STG 5 Progress Met  -     STG 6 Patient to perform 4-way hip SLR x 20 reps in each direction to increase B hip strength.  -     STG 6 Progress Met  -        Long Term Goals    LTG 1 I with final HEP.  -     LTG 1 Progress Met  -     LTG 2 B LE strength 5/5, no increase in pain.  -     LTG 2 Progress Met  -     LTG 3 Patient to perform >/= 10-15 minutes of standing B LE strength activities with no rerports of increased pain.  -     LTG 3 Progress Met  -     LTG 4 Patient to ambulate >/= 1/8 mile with B tennis shoes with non-antalgic gait with no increase in foot pain.  -     LTG 4  Progress Partially Met  -     LTG 4 Progress Comments Met in sandals  -     LTG 5 B SLS >/= 30 seconds with EO/EC.  -     LTG 5 Progress Partially Met  -     LTG 5 Progress Comments Met for EO, not EC.  -        Time Calculation    PT Goal Re-Cert Due Date --  N/A  -               User Key  (r) = Recorded By, (t) = Taken By, (c) = Cosigned By      Initials Name Provider Type    Mari Landa, PT DPT Physical Therapist                    Therapy Education  Given: HEP, Symptoms/condition management, Pain management, Posture/body mechanics, Other (comment) (Orthotics, shoe wear)  Program: Reinforced  How Provided: Verbal  Provided to: Patient  Level of Understanding: Verbalized, Demonstrated    Outcome Measure Options: Lower Extremity Functional Scale (LEFS)  Lower Extremity Functional Index  Any of your usual work, housework or school activities: No difficulty  Your usual hobbies, recreational or sporting activities: No difficulty  Getting into or out of the bath: No difficulty  Walking between rooms: No difficulty  Putting on your shoes or socks: No difficulty  Squatting: No difficulty  Lifting an object, like a bag of groceries from the floor: No difficulty  Performing light activities around your home: No difficulty  Performing heavy activities around your home: No difficulty  Getting into or out of a car: No difficulty  Walking 2 blocks: No difficulty  Walking a mile: No difficulty  Going up or down 10 stairs (about 1 flight of stairs): No difficulty  Standing for 1 hour: A little bit of difficulty  Sitting for 1 hour: A little bit of difficulty  Running on even ground: A little bit of difficulty  Running on uneven ground: A little bit of difficulty  Making sharp turns while running fast: A little bit of difficulty  Hopping: No difficulty  Rolling over in bed: No difficulty  Total: 75  Lower Extremity Functional Index  Any of your usual work, housework or school activities: No  difficulty  Your usual hobbies, recreational or sporting activities: No difficulty  Getting into or out of the bath: No difficulty  Walking between rooms: No difficulty  Putting on your shoes or socks: No difficulty  Squatting: No difficulty  Lifting an object, like a bag of groceries from the floor: No difficulty  Performing light activities around your home: No difficulty  Performing heavy activities around your home: No difficulty  Getting into or out of a car: No difficulty  Walking 2 blocks: No difficulty  Walking a mile: No difficulty  Going up or down 10 stairs (about 1 flight of stairs): No difficulty  Standing for 1 hour: A little bit of difficulty  Sitting for 1 hour: A little bit of difficulty  Running on even ground: A little bit of difficulty  Running on uneven ground: A little bit of difficulty  Making sharp turns while running fast: A little bit of difficulty  Hopping: No difficulty  Rolling over in bed: No difficulty  Total: 75      Time Calculation:   Start Time: 1007 (Patient arrived late)  Stop Time: 1106  Time Calculation (min): 59 min  Total Timed Code Minutes- PT: 59 minute(s)  Therapy Charges for Today       Code Description Service Date Service Provider Modifiers Qty    73425281764 HC PT THER SUPP EA 15 MIN 8/28/2023 Mari Lira, PT DPT GP 1    99257213895 HC PT THER PROC EA 15 MIN 8/28/2023 Mari Lira, PT DPT GP 3    70724104209 HC PT THERAPEUTIC ACT EA 15 MIN 8/28/2023 Mari Lira, PT DPT GP 1            PT G-Codes  Outcome Measure Options: Lower Extremity Functional Scale (LEFS)  Total: 75     OP PT Discharge Summary  Date of Discharge: 08/28/23  Reason for Discharge: Independent  Outcomes Achieved: Patient able to partially acheive established goals, Refer to plan of care for updates on goals achieved  Discharge Destination: Home with home program      This document has been electronically signed by Mari Lira PT DPT, [facility] on August 28, 2023 11:07  CDT

## 2023-08-31 ENCOUNTER — APPOINTMENT (OUTPATIENT)
Dept: PHYSICAL THERAPY | Facility: HOSPITAL | Age: 78
End: 2023-08-31
Payer: MEDICARE

## 2023-09-12 ENCOUNTER — OFFICE VISIT (OUTPATIENT)
Dept: PODIATRY | Facility: CLINIC | Age: 78
End: 2023-09-12
Payer: MEDICARE

## 2023-09-12 VITALS
OXYGEN SATURATION: 99 % | SYSTOLIC BLOOD PRESSURE: 123 MMHG | BODY MASS INDEX: 20.24 KG/M2 | HEART RATE: 50 BPM | HEIGHT: 62 IN | WEIGHT: 110 LBS | DIASTOLIC BLOOD PRESSURE: 98 MMHG

## 2023-09-12 DIAGNOSIS — M19.079 ARTHRITIS OF MIDFOOT: ICD-10-CM

## 2023-09-12 DIAGNOSIS — M79.672 BILATERAL FOOT PAIN: Primary | ICD-10-CM

## 2023-09-12 DIAGNOSIS — M79.671 BILATERAL FOOT PAIN: Primary | ICD-10-CM

## 2023-09-12 PROCEDURE — 99212 OFFICE O/P EST SF 10 MIN: CPT | Performed by: NURSE PRACTITIONER

## 2023-09-12 PROCEDURE — 1160F RVW MEDS BY RX/DR IN RCRD: CPT | Performed by: NURSE PRACTITIONER

## 2023-09-12 PROCEDURE — 1159F MED LIST DOCD IN RCRD: CPT | Performed by: NURSE PRACTITIONER

## 2023-09-12 NOTE — PROGRESS NOTES
Mona Lyle  1945  78 y.o. female      09/12/2023    Chief Complaint   Patient presents with    Left Foot - Follow-up     8 WK- Bilateral FT pain and cramping    Right Foot - Follow-up     8 WK- Bilateral FT pain and cramping       History of Present Illness    Mona Lyle is a 78 y.o.female follow up on b/l foot pain. Pt reports improvement with stretching and PT. No new pedal complaints.      Past Medical History:   Diagnosis Date    Arthritis     Osteoporosis     Sleep apnea          Past Surgical History:   Procedure Laterality Date    EYE SURGERY      SHOULDER SURGERY Right     rotator cuff         History reviewed. No pertinent family history.    Allergies   Allergen Reactions    Codeine Unknown - Low Severity       Social History     Socioeconomic History    Marital status:    Tobacco Use    Smoking status: Never    Smokeless tobacco: Never   Vaping Use    Vaping Use: Never used   Substance and Sexual Activity    Alcohol use: Yes     Alcohol/week: 1.0 standard drink     Types: 1 Glasses of wine per week    Drug use: Not Currently    Sexual activity: Defer         Current Outpatient Medications   Medication Sig Dispense Refill    benzonatate (TESSALON) 100 MG capsule Take 1 capsule by mouth 3 (Three) Times a Day.      Cetirizine HCl (ZyrTEC Allergy) 10 MG capsule Take by oral route.      citalopram (CeleXA) 20 MG tablet 1.5 tablets.      diclofenac sodium (VOTAREN XR) 100 MG 24 hr tablet Take 1 tablet every day by oral route with meals.      donepezil (ARICEPT) 10 MG tablet Take 1 tablet every day by oral route.      lovastatin (MEVACOR) 20 MG tablet Take 1 tablet every day by oral route.      temazepam (RESTORIL) 15 MG capsule Take 1 capsule every day by oral route.      losartan (COZAAR) 25 MG tablet Take 1 tablet every day by oral route. (Patient not taking: Reported on 9/12/2023)       No current facility-administered medications for this visit.       Review of Systems  "  Constitutional: Negative.    Respiratory: Negative.     Cardiovascular: Negative.    Musculoskeletal: Negative.    Skin: Negative.    Neurological: Negative.    Hematological: Negative.    Psychiatric/Behavioral: Negative.         OBJECTIVE    /98 (BP Location: Right arm, Patient Position: Lying, Cuff Size: Adult)   Pulse 50   Ht 157.5 cm (62\")   Wt 49.9 kg (110 lb)   SpO2 99%   BMI 20.12 kg/m²     Physical Exam  Vitals reviewed.   Constitutional:       General: She is not in acute distress.     Appearance: Normal appearance.   HENT:      Head: Normocephalic.   Eyes:      Pupils: Pupils are equal, round, and reactive to light.   Cardiovascular:      Pulses:           Dorsalis pedis pulses are 2+ on the right side and 2+ on the left side.        Posterior tibial pulses are 2+ on the right side and 2+ on the left side.   Pulmonary:      Effort: No respiratory distress.   Musculoskeletal:         General: No signs of injury.      Cervical back: Neck supple.      Right foot: Normal range of motion. Bunion present.      Left foot: Normal range of motion. Bunion present.   Feet:      Right foot:      Skin integrity: Skin integrity normal.      Toenail Condition: Right toenails are normal.      Left foot:      Skin integrity: Skin integrity normal.      Toenail Condition: Left toenails are normal.      Comments: Crepitus noted to b/l midfoot      Skin:     General: Skin is warm and dry.      Findings: No erythema.   Neurological:      General: No focal deficit present.      Mental Status: She is alert.   Psychiatric:         Mood and Affect: Mood normal.         Behavior: Behavior normal.            Procedures        ASSESSMENT AND PLAN    Diagnoses and all orders for this visit:    1. Bilateral foot pain (Primary)    2. Arthritis of midfoot        - Comprehensive foot and ankle exam performed  - Recommending conservative tx vs surgical intervention at this time.  - NSAIDs (pt has Diclofenac Rxd for knee " arthritis that she has not yet filled).  - Patient advised to continue to stretch, ice and to make appropriate shoe gear changes to include wearing athletic type shoes with supportive insoles. Patient was given written instructions on how to correctly perform the stretching of the Achilles tendon/calf stretches to decrease forefoot pressures. Limit bare foot walking.    - Recommended continuing inserts.  - F/u PRN.            This document has been electronically signed by JESICA Hankins on September 12, 2023 12:47 CDT